# Patient Record
Sex: FEMALE | Race: BLACK OR AFRICAN AMERICAN
[De-identification: names, ages, dates, MRNs, and addresses within clinical notes are randomized per-mention and may not be internally consistent; named-entity substitution may affect disease eponyms.]

---

## 2019-04-26 ENCOUNTER — HOSPITAL ENCOUNTER (EMERGENCY)
Dept: HOSPITAL 94 - ER | Age: 49
Discharge: HOME | End: 2019-04-26
Payer: MEDICAID

## 2019-04-26 VITALS — BODY MASS INDEX: 44.41 KG/M2 | HEIGHT: 64 IN | WEIGHT: 260.15 LBS

## 2019-04-26 VITALS — SYSTOLIC BLOOD PRESSURE: 195 MMHG | DIASTOLIC BLOOD PRESSURE: 107 MMHG

## 2019-04-26 DIAGNOSIS — I10: Primary | ICD-10-CM

## 2019-04-26 PROCEDURE — 99283 EMERGENCY DEPT VISIT LOW MDM: CPT

## 2019-04-26 NOTE — NUR
Spoke to ALEXUS Jha regarding pt complaints. Per MD, pt does not need EKG at 
this time as she is not experiencing any chest pain or SOB.